# Patient Record
Sex: MALE | Race: WHITE | NOT HISPANIC OR LATINO
[De-identification: names, ages, dates, MRNs, and addresses within clinical notes are randomized per-mention and may not be internally consistent; named-entity substitution may affect disease eponyms.]

---

## 2024-07-08 ENCOUNTER — NON-APPOINTMENT (OUTPATIENT)
Age: 29
End: 2024-07-08

## 2024-07-09 ENCOUNTER — EMERGENCY (EMERGENCY)
Facility: HOSPITAL | Age: 29
LOS: 0 days | Discharge: HOME | End: 2024-07-09
Attending: STUDENT IN AN ORGANIZED HEALTH CARE EDUCATION/TRAINING PROGRAM
Payer: COMMERCIAL

## 2024-07-09 VITALS
DIASTOLIC BLOOD PRESSURE: 82 MMHG | RESPIRATION RATE: 16 BRPM | OXYGEN SATURATION: 98 % | SYSTOLIC BLOOD PRESSURE: 125 MMHG | TEMPERATURE: 98 F | WEIGHT: 130.07 LBS | HEART RATE: 73 BPM

## 2024-07-09 DIAGNOSIS — R19.7 DIARRHEA, UNSPECIFIED: ICD-10-CM

## 2024-07-09 DIAGNOSIS — R10.13 EPIGASTRIC PAIN: ICD-10-CM

## 2024-07-09 DIAGNOSIS — R11.2 NAUSEA WITH VOMITING, UNSPECIFIED: ICD-10-CM

## 2024-07-09 LAB
ACANTHOCYTES BLD QL SMEAR: SLIGHT — SIGNIFICANT CHANGE UP
ALBUMIN SERPL ELPH-MCNC: 4.8 G/DL — SIGNIFICANT CHANGE UP (ref 3.5–5.2)
ALP SERPL-CCNC: 76 U/L — SIGNIFICANT CHANGE UP (ref 30–115)
ALT FLD-CCNC: 14 U/L — SIGNIFICANT CHANGE UP (ref 0–41)
ANION GAP SERPL CALC-SCNC: 13 MMOL/L — SIGNIFICANT CHANGE UP (ref 7–14)
ANISOCYTOSIS BLD QL: SLIGHT — SIGNIFICANT CHANGE UP
AST SERPL-CCNC: 19 U/L — SIGNIFICANT CHANGE UP (ref 0–41)
BASOPHILS # BLD AUTO: 0.17 K/UL — SIGNIFICANT CHANGE UP (ref 0–0.2)
BASOPHILS NFR BLD AUTO: 0.9 % — SIGNIFICANT CHANGE UP (ref 0–1)
BILIRUB SERPL-MCNC: 0.4 MG/DL — SIGNIFICANT CHANGE UP (ref 0.2–1.2)
BUN SERPL-MCNC: 10 MG/DL — SIGNIFICANT CHANGE UP (ref 10–20)
BURR CELLS BLD QL SMEAR: PRESENT — SIGNIFICANT CHANGE UP
CALCIUM SERPL-MCNC: 10.1 MG/DL — SIGNIFICANT CHANGE UP (ref 8.4–10.4)
CHLORIDE SERPL-SCNC: 96 MMOL/L — LOW (ref 98–110)
CO2 SERPL-SCNC: 30 MMOL/L — SIGNIFICANT CHANGE UP (ref 17–32)
CREAT SERPL-MCNC: 1 MG/DL — SIGNIFICANT CHANGE UP (ref 0.7–1.5)
EGFR: 104 ML/MIN/1.73M2 — SIGNIFICANT CHANGE UP
EOSINOPHIL # BLD AUTO: 0 K/UL — SIGNIFICANT CHANGE UP (ref 0–0.7)
EOSINOPHIL NFR BLD AUTO: 0 % — SIGNIFICANT CHANGE UP (ref 0–8)
GIANT PLATELETS BLD QL SMEAR: PRESENT — SIGNIFICANT CHANGE UP
GLUCOSE SERPL-MCNC: 123 MG/DL — HIGH (ref 70–99)
HCT VFR BLD CALC: 35.2 % — LOW (ref 42–52)
HGB BLD-MCNC: 10.5 G/DL — LOW (ref 14–18)
HYPOCHROMIA BLD QL: SLIGHT — SIGNIFICANT CHANGE UP
LIDOCAIN IGE QN: 25 U/L — SIGNIFICANT CHANGE UP (ref 7–60)
LYMPHOCYTES # BLD AUTO: 0.34 K/UL — LOW (ref 1.2–3.4)
LYMPHOCYTES # BLD AUTO: 1.8 % — LOW (ref 20.5–51.1)
MANUAL SMEAR VERIFICATION: SIGNIFICANT CHANGE UP
MCHC RBC-ENTMCNC: 21.9 PG — LOW (ref 27–31)
MCHC RBC-ENTMCNC: 29.8 G/DL — LOW (ref 32–37)
MCV RBC AUTO: 73.5 FL — LOW (ref 80–94)
MICROCYTES BLD QL: SLIGHT — SIGNIFICANT CHANGE UP
MONOCYTES # BLD AUTO: 1.65 K/UL — HIGH (ref 0.1–0.6)
MONOCYTES NFR BLD AUTO: 8.8 % — SIGNIFICANT CHANGE UP (ref 1.7–9.3)
NEUTROPHILS # BLD AUTO: 16.13 K/UL — HIGH (ref 1.4–6.5)
NEUTROPHILS NFR BLD AUTO: 85.8 % — HIGH (ref 42.2–75.2)
OVALOCYTES BLD QL SMEAR: SLIGHT — SIGNIFICANT CHANGE UP
PLAT MORPH BLD: ABNORMAL
PLATELET # BLD AUTO: 361 K/UL — SIGNIFICANT CHANGE UP (ref 130–400)
PMV BLD: 10.9 FL — HIGH (ref 7.4–10.4)
POIKILOCYTOSIS BLD QL AUTO: SLIGHT — SIGNIFICANT CHANGE UP
POLYCHROMASIA BLD QL SMEAR: SIGNIFICANT CHANGE UP
POTASSIUM SERPL-MCNC: 4 MMOL/L — SIGNIFICANT CHANGE UP (ref 3.5–5)
POTASSIUM SERPL-SCNC: 4 MMOL/L — SIGNIFICANT CHANGE UP (ref 3.5–5)
PROT SERPL-MCNC: 7.8 G/DL — SIGNIFICANT CHANGE UP (ref 6–8)
RBC # BLD: 4.79 M/UL — SIGNIFICANT CHANGE UP (ref 4.7–6.1)
RBC # FLD: 16.9 % — HIGH (ref 11.5–14.5)
RBC BLD AUTO: ABNORMAL
SODIUM SERPL-SCNC: 139 MMOL/L — SIGNIFICANT CHANGE UP (ref 135–146)
STOMATOCYTES BLD QL SMEAR: SLIGHT — SIGNIFICANT CHANGE UP
VARIANT LYMPHS # BLD: 2.7 % — SIGNIFICANT CHANGE UP (ref 0–5)
WBC # BLD: 18.8 K/UL — HIGH (ref 4.8–10.8)
WBC # FLD AUTO: 18.8 K/UL — HIGH (ref 4.8–10.8)

## 2024-07-09 PROCEDURE — 96374 THER/PROPH/DIAG INJ IV PUSH: CPT

## 2024-07-09 PROCEDURE — 80053 COMPREHEN METABOLIC PANEL: CPT

## 2024-07-09 PROCEDURE — 96375 TX/PRO/DX INJ NEW DRUG ADDON: CPT

## 2024-07-09 PROCEDURE — 85025 COMPLETE CBC W/AUTO DIFF WBC: CPT

## 2024-07-09 PROCEDURE — 83690 ASSAY OF LIPASE: CPT

## 2024-07-09 PROCEDURE — 99284 EMERGENCY DEPT VISIT MOD MDM: CPT

## 2024-07-09 PROCEDURE — 99284 EMERGENCY DEPT VISIT MOD MDM: CPT | Mod: 25

## 2024-07-09 PROCEDURE — 36415 COLL VENOUS BLD VENIPUNCTURE: CPT

## 2024-07-09 RX ORDER — METOCLOPRAMIDE 5 MG/5ML
10 SOLUTION ORAL ONCE
Refills: 0 | Status: COMPLETED | OUTPATIENT
Start: 2024-07-09 | End: 2024-07-09

## 2024-07-09 RX ORDER — SODIUM CHLORIDE 0.9 % (FLUSH) 0.9 %
1000 SYRINGE (ML) INJECTION ONCE
Refills: 0 | Status: COMPLETED | OUTPATIENT
Start: 2024-07-09 | End: 2024-07-09

## 2024-07-09 RX ORDER — FAMOTIDINE 40 MG
20 TABLET ORAL DAILY
Refills: 0 | Status: DISCONTINUED | OUTPATIENT
Start: 2024-07-09 | End: 2024-07-09

## 2024-07-09 RX ORDER — ONDANSETRON HYDROCHLORIDE 2 MG/ML
4 INJECTION INTRAMUSCULAR; INTRAVENOUS ONCE
Refills: 0 | Status: COMPLETED | OUTPATIENT
Start: 2024-07-09 | End: 2024-07-09

## 2024-07-09 RX ORDER — ONDANSETRON HYDROCHLORIDE 2 MG/ML
1 INJECTION INTRAMUSCULAR; INTRAVENOUS
Qty: 1 | Refills: 0
Start: 2024-07-09 | End: 2024-07-11

## 2024-07-09 RX ADMIN — Medication 100 MILLIGRAM(S): at 17:17

## 2024-07-09 RX ADMIN — METOCLOPRAMIDE 10 MILLIGRAM(S): 5 SOLUTION ORAL at 15:17

## 2024-07-09 RX ADMIN — ONDANSETRON HYDROCHLORIDE 4 MILLIGRAM(S): 2 INJECTION INTRAMUSCULAR; INTRAVENOUS at 17:17

## 2024-07-09 RX ADMIN — Medication 1000 MILLILITER(S): at 15:17

## 2024-07-14 ENCOUNTER — INPATIENT (INPATIENT)
Facility: HOSPITAL | Age: 29
LOS: 2 days | Discharge: HOME | DRG: 382 | End: 2024-07-17
Attending: SURGERY | Admitting: SURGERY
Payer: COMMERCIAL

## 2024-07-14 VITALS
OXYGEN SATURATION: 99 % | RESPIRATION RATE: 20 BRPM | HEART RATE: 89 BPM | TEMPERATURE: 98 F | SYSTOLIC BLOOD PRESSURE: 126 MMHG | DIASTOLIC BLOOD PRESSURE: 91 MMHG

## 2024-07-14 LAB
ALBUMIN SERPL ELPH-MCNC: 5.1 G/DL — SIGNIFICANT CHANGE UP (ref 3.5–5.2)
ALP SERPL-CCNC: 64 U/L — SIGNIFICANT CHANGE UP (ref 30–115)
ALT FLD-CCNC: 11 U/L — SIGNIFICANT CHANGE UP (ref 0–41)
ANION GAP SERPL CALC-SCNC: 12 MMOL/L — SIGNIFICANT CHANGE UP (ref 7–14)
AST SERPL-CCNC: 16 U/L — SIGNIFICANT CHANGE UP (ref 0–41)
BASOPHILS # BLD AUTO: 0.09 K/UL — SIGNIFICANT CHANGE UP (ref 0–0.2)
BASOPHILS NFR BLD AUTO: 0.9 % — SIGNIFICANT CHANGE UP (ref 0–1)
BILIRUB SERPL-MCNC: 0.5 MG/DL — SIGNIFICANT CHANGE UP (ref 0.2–1.2)
BUN SERPL-MCNC: 9 MG/DL — LOW (ref 10–20)
CALCIUM SERPL-MCNC: 10.3 MG/DL — SIGNIFICANT CHANGE UP (ref 8.4–10.5)
CHLORIDE SERPL-SCNC: 96 MMOL/L — LOW (ref 98–110)
CO2 SERPL-SCNC: 33 MMOL/L — HIGH (ref 17–32)
CREAT SERPL-MCNC: 1 MG/DL — SIGNIFICANT CHANGE UP (ref 0.7–1.5)
EGFR: 104 ML/MIN/1.73M2 — SIGNIFICANT CHANGE UP
EOSINOPHIL # BLD AUTO: 0 K/UL — SIGNIFICANT CHANGE UP (ref 0–0.7)
EOSINOPHIL NFR BLD AUTO: 0 % — SIGNIFICANT CHANGE UP (ref 0–8)
GLUCOSE SERPL-MCNC: 169 MG/DL — HIGH (ref 70–99)
HCT VFR BLD CALC: 37 % — LOW (ref 42–52)
HGB BLD-MCNC: 11.2 G/DL — LOW (ref 14–18)
IMM GRANULOCYTES NFR BLD AUTO: 0.3 % — SIGNIFICANT CHANGE UP (ref 0.1–0.3)
LIDOCAIN IGE QN: 26 U/L — SIGNIFICANT CHANGE UP (ref 7–60)
LYMPHOCYTES # BLD AUTO: 0.89 K/UL — LOW (ref 1.2–3.4)
LYMPHOCYTES # BLD AUTO: 9 % — LOW (ref 20.5–51.1)
MCHC RBC-ENTMCNC: 22 PG — LOW (ref 27–31)
MCHC RBC-ENTMCNC: 30.3 G/DL — LOW (ref 32–37)
MCV RBC AUTO: 72.7 FL — LOW (ref 80–94)
MONOCYTES # BLD AUTO: 0.82 K/UL — HIGH (ref 0.1–0.6)
MONOCYTES NFR BLD AUTO: 8.3 % — SIGNIFICANT CHANGE UP (ref 1.7–9.3)
NEUTROPHILS # BLD AUTO: 8.05 K/UL — HIGH (ref 1.4–6.5)
NEUTROPHILS NFR BLD AUTO: 81.5 % — HIGH (ref 42.2–75.2)
NRBC # BLD: 0 /100 WBCS — SIGNIFICANT CHANGE UP (ref 0–0)
PLATELET # BLD AUTO: 426 K/UL — HIGH (ref 130–400)
PMV BLD: 11.1 FL — HIGH (ref 7.4–10.4)
POTASSIUM SERPL-MCNC: 4.2 MMOL/L — SIGNIFICANT CHANGE UP (ref 3.5–5)
POTASSIUM SERPL-SCNC: 4.2 MMOL/L — SIGNIFICANT CHANGE UP (ref 3.5–5)
PROT SERPL-MCNC: 8.4 G/DL — HIGH (ref 6–8)
RBC # BLD: 5.09 M/UL — SIGNIFICANT CHANGE UP (ref 4.7–6.1)
RBC # FLD: 16.5 % — HIGH (ref 11.5–14.5)
SODIUM SERPL-SCNC: 141 MMOL/L — SIGNIFICANT CHANGE UP (ref 135–146)
TROPONIN T, HIGH SENSITIVITY RESULT: <6 NG/L — SIGNIFICANT CHANGE UP (ref 6–21)
WBC # BLD: 9.88 K/UL — SIGNIFICANT CHANGE UP (ref 4.8–10.8)
WBC # FLD AUTO: 9.88 K/UL — SIGNIFICANT CHANGE UP (ref 4.8–10.8)

## 2024-07-14 PROCEDURE — 74177 CT ABD & PELVIS W/CONTRAST: CPT | Mod: 26,MC

## 2024-07-14 PROCEDURE — 99285 EMERGENCY DEPT VISIT HI MDM: CPT

## 2024-07-14 RX ORDER — ONDANSETRON HYDROCHLORIDE 2 MG/ML
4 INJECTION INTRAMUSCULAR; INTRAVENOUS ONCE
Refills: 0 | Status: COMPLETED | OUTPATIENT
Start: 2024-07-14 | End: 2024-07-14

## 2024-07-14 RX ORDER — SODIUM CHLORIDE 0.9 % (FLUSH) 0.9 %
1000 SYRINGE (ML) INJECTION ONCE
Refills: 0 | Status: COMPLETED | OUTPATIENT
Start: 2024-07-14 | End: 2024-07-14

## 2024-07-14 RX ORDER — FAMOTIDINE 40 MG
20 TABLET ORAL ONCE
Refills: 0 | Status: COMPLETED | OUTPATIENT
Start: 2024-07-14 | End: 2024-07-14

## 2024-07-14 RX ADMIN — ONDANSETRON HYDROCHLORIDE 4 MILLIGRAM(S): 2 INJECTION INTRAMUSCULAR; INTRAVENOUS at 23:02

## 2024-07-14 RX ADMIN — Medication 20 MILLIGRAM(S): at 23:03

## 2024-07-14 RX ADMIN — Medication 1000 MILLILITER(S): at 23:02

## 2024-07-15 DIAGNOSIS — K31.1 ADULT HYPERTROPHIC PYLORIC STENOSIS: ICD-10-CM

## 2024-07-15 PROBLEM — Z78.9 OTHER SPECIFIED HEALTH STATUS: Chronic | Status: ACTIVE | Noted: 2024-07-09

## 2024-07-15 LAB
ANION GAP SERPL CALC-SCNC: 10 MMOL/L — SIGNIFICANT CHANGE UP (ref 7–14)
BASOPHILS # BLD AUTO: 0.08 K/UL — SIGNIFICANT CHANGE UP (ref 0–0.2)
BASOPHILS NFR BLD AUTO: 0.8 % — SIGNIFICANT CHANGE UP (ref 0–1)
BUN SERPL-MCNC: 4 MG/DL — LOW (ref 10–20)
CALCIUM SERPL-MCNC: 9.1 MG/DL — SIGNIFICANT CHANGE UP (ref 8.4–10.5)
CHLORIDE SERPL-SCNC: 103 MMOL/L — SIGNIFICANT CHANGE UP (ref 98–110)
CO2 SERPL-SCNC: 28 MMOL/L — SIGNIFICANT CHANGE UP (ref 17–32)
CREAT SERPL-MCNC: 0.9 MG/DL — SIGNIFICANT CHANGE UP (ref 0.7–1.5)
EGFR: 119 ML/MIN/1.73M2 — SIGNIFICANT CHANGE UP
EOSINOPHIL # BLD AUTO: 0.28 K/UL — SIGNIFICANT CHANGE UP (ref 0–0.7)
EOSINOPHIL NFR BLD AUTO: 2.9 % — SIGNIFICANT CHANGE UP (ref 0–8)
GLUCOSE SERPL-MCNC: 79 MG/DL — SIGNIFICANT CHANGE UP (ref 70–99)
HCT VFR BLD CALC: 33.5 % — LOW (ref 42–52)
HGB BLD-MCNC: 9.7 G/DL — LOW (ref 14–18)
IMM GRANULOCYTES NFR BLD AUTO: 0.2 % — SIGNIFICANT CHANGE UP (ref 0.1–0.3)
IRON SATN MFR SERPL: 16 UG/DL — LOW (ref 35–150)
IRON SATN MFR SERPL: 5 % — LOW (ref 15–50)
LYMPHOCYTES # BLD AUTO: 2.62 K/UL — SIGNIFICANT CHANGE UP (ref 1.2–3.4)
LYMPHOCYTES # BLD AUTO: 27.3 % — SIGNIFICANT CHANGE UP (ref 20.5–51.1)
MAGNESIUM SERPL-MCNC: 1.9 MG/DL — SIGNIFICANT CHANGE UP (ref 1.8–2.4)
MCHC RBC-ENTMCNC: 21.7 PG — LOW (ref 27–31)
MCHC RBC-ENTMCNC: 29 G/DL — LOW (ref 32–37)
MCV RBC AUTO: 75.1 FL — LOW (ref 80–94)
MONOCYTES # BLD AUTO: 1.07 K/UL — HIGH (ref 0.1–0.6)
MONOCYTES NFR BLD AUTO: 11.2 % — HIGH (ref 1.7–9.3)
NEUTROPHILS # BLD AUTO: 5.51 K/UL — SIGNIFICANT CHANGE UP (ref 1.4–6.5)
NEUTROPHILS NFR BLD AUTO: 57.6 % — SIGNIFICANT CHANGE UP (ref 42.2–75.2)
NRBC # BLD: 0 /100 WBCS — SIGNIFICANT CHANGE UP (ref 0–0)
PHOSPHATE SERPL-MCNC: 3.1 MG/DL — SIGNIFICANT CHANGE UP (ref 2.1–4.9)
PLATELET # BLD AUTO: 353 K/UL — SIGNIFICANT CHANGE UP (ref 130–400)
PMV BLD: 10.8 FL — HIGH (ref 7.4–10.4)
POTASSIUM SERPL-MCNC: 4.5 MMOL/L — SIGNIFICANT CHANGE UP (ref 3.5–5)
POTASSIUM SERPL-SCNC: 4.5 MMOL/L — SIGNIFICANT CHANGE UP (ref 3.5–5)
RBC # BLD: 4.46 M/UL — LOW (ref 4.7–6.1)
RBC # FLD: 16.8 % — HIGH (ref 11.5–14.5)
SODIUM SERPL-SCNC: 141 MMOL/L — SIGNIFICANT CHANGE UP (ref 135–146)
TIBC SERPL-MCNC: 343 UG/DL — SIGNIFICANT CHANGE UP (ref 220–430)
TRANSFERRIN SERPL-MCNC: 293 MG/DL — SIGNIFICANT CHANGE UP (ref 200–360)
UIBC SERPL-MCNC: 327 UG/DL — SIGNIFICANT CHANGE UP (ref 110–370)
WBC # BLD: 9.58 K/UL — SIGNIFICANT CHANGE UP (ref 4.8–10.8)
WBC # FLD AUTO: 9.58 K/UL — SIGNIFICANT CHANGE UP (ref 4.8–10.8)

## 2024-07-15 PROCEDURE — 74018 RADEX ABDOMEN 1 VIEW: CPT

## 2024-07-15 PROCEDURE — 88305 TISSUE EXAM BY PATHOLOGIST: CPT

## 2024-07-15 PROCEDURE — 86364 TISS TRNSGLTMNASE EA IG CLAS: CPT

## 2024-07-15 PROCEDURE — 83540 ASSAY OF IRON: CPT

## 2024-07-15 PROCEDURE — 84466 ASSAY OF TRANSFERRIN: CPT

## 2024-07-15 PROCEDURE — 83735 ASSAY OF MAGNESIUM: CPT

## 2024-07-15 PROCEDURE — 84100 ASSAY OF PHOSPHORUS: CPT

## 2024-07-15 PROCEDURE — 36415 COLL VENOUS BLD VENIPUNCTURE: CPT

## 2024-07-15 PROCEDURE — 80048 BASIC METABOLIC PNL TOTAL CA: CPT

## 2024-07-15 PROCEDURE — 88312 SPECIAL STAINS GROUP 1: CPT

## 2024-07-15 PROCEDURE — 93005 ELECTROCARDIOGRAM TRACING: CPT

## 2024-07-15 PROCEDURE — 85025 COMPLETE CBC W/AUTO DIFF WBC: CPT

## 2024-07-15 PROCEDURE — 93010 ELECTROCARDIOGRAM REPORT: CPT

## 2024-07-15 PROCEDURE — 99223 1ST HOSP IP/OBS HIGH 75: CPT

## 2024-07-15 PROCEDURE — 71046 X-RAY EXAM CHEST 2 VIEWS: CPT | Mod: 26

## 2024-07-15 PROCEDURE — 83550 IRON BINDING TEST: CPT

## 2024-07-15 PROCEDURE — 82728 ASSAY OF FERRITIN: CPT

## 2024-07-15 PROCEDURE — 86140 C-REACTIVE PROTEIN: CPT

## 2024-07-15 PROCEDURE — 83036 HEMOGLOBIN GLYCOSYLATED A1C: CPT

## 2024-07-15 PROCEDURE — 85652 RBC SED RATE AUTOMATED: CPT

## 2024-07-15 RX ORDER — ENOXAPARIN SODIUM 100 MG/ML
40 INJECTION SUBCUTANEOUS EVERY 24 HOURS
Refills: 0 | Status: DISCONTINUED | OUTPATIENT
Start: 2024-07-15 | End: 2024-07-17

## 2024-07-15 RX ORDER — DEXTROSE MONOHYDRATE AND SODIUM CHLORIDE 5; .3 G/100ML; G/100ML
1000 INJECTION, SOLUTION INTRAVENOUS
Refills: 0 | Status: DISCONTINUED | OUTPATIENT
Start: 2024-07-15 | End: 2024-07-17

## 2024-07-15 RX ORDER — PANTOPRAZOLE SODIUM 40 MG/10ML
40 INJECTION, POWDER, FOR SOLUTION INTRAVENOUS DAILY
Refills: 0 | Status: DISCONTINUED | OUTPATIENT
Start: 2024-07-15 | End: 2024-07-16

## 2024-07-15 RX ORDER — ACETAMINOPHEN 325 MG
650 TABLET ORAL EVERY 6 HOURS
Refills: 0 | Status: DISCONTINUED | OUTPATIENT
Start: 2024-07-15 | End: 2024-07-17

## 2024-07-15 RX ORDER — ONDANSETRON HYDROCHLORIDE 2 MG/ML
4 INJECTION INTRAMUSCULAR; INTRAVENOUS ONCE
Refills: 0 | Status: COMPLETED | OUTPATIENT
Start: 2024-07-15 | End: 2024-07-15

## 2024-07-15 RX ORDER — ONDANSETRON HYDROCHLORIDE 2 MG/ML
4 INJECTION INTRAMUSCULAR; INTRAVENOUS EVERY 6 HOURS
Refills: 0 | Status: DISCONTINUED | OUTPATIENT
Start: 2024-07-15 | End: 2024-07-17

## 2024-07-15 RX ORDER — PANTOPRAZOLE SODIUM 40 MG/10ML
40 INJECTION, POWDER, FOR SOLUTION INTRAVENOUS ONCE
Refills: 0 | Status: COMPLETED | OUTPATIENT
Start: 2024-07-15 | End: 2024-07-15

## 2024-07-15 RX ORDER — DEXTROSE MONOHYDRATE AND SODIUM CHLORIDE 5; .3 G/100ML; G/100ML
1000 INJECTION, SOLUTION INTRAVENOUS ONCE
Refills: 0 | Status: COMPLETED | OUTPATIENT
Start: 2024-07-15 | End: 2024-07-15

## 2024-07-15 RX ADMIN — DEXTROSE MONOHYDRATE AND SODIUM CHLORIDE 110 MILLILITER(S): 5; .3 INJECTION, SOLUTION INTRAVENOUS at 08:10

## 2024-07-15 RX ADMIN — PANTOPRAZOLE SODIUM 40 MILLIGRAM(S): 40 INJECTION, POWDER, FOR SOLUTION INTRAVENOUS at 02:28

## 2024-07-15 RX ADMIN — DEXTROSE MONOHYDRATE AND SODIUM CHLORIDE 1000 MILLILITER(S): 5; .3 INJECTION, SOLUTION INTRAVENOUS at 02:28

## 2024-07-15 RX ADMIN — Medication 650 MILLIGRAM(S): at 05:31

## 2024-07-15 RX ADMIN — ONDANSETRON HYDROCHLORIDE 4 MILLIGRAM(S): 2 INJECTION INTRAMUSCULAR; INTRAVENOUS at 02:28

## 2024-07-15 NOTE — ED PROVIDER NOTE - PHYSICAL EXAMINATION
VITAL SIGNS: I have reviewed nursing notes and confirm.  CONSTITUTIONAL: in no acute distress.  SKIN: Skin exam is warm and dry, no acute rash.  HEAD: Normocephalic; atraumatic.  EYES:  EOM intact; conjunctiva and sclera clear.  ENT: No nasal discharge; airway clear.   NECK: Supple; non tender.  CARD: S1, S2 normal; no murmurs, gallops, or rubs. Regular rate and rhythm.  RESP: No wheezes, rales or rhonchi. Speaking in full sentences.   ABD: soft; non-distended; epigastric tender; No rebound or guarding. No CVA tenderness.  NEURO: Alert, oriented. Grossly unremarkable. No focal deficits.

## 2024-07-15 NOTE — ED ADULT NURSE REASSESSMENT NOTE - NS ED NURSE REASSESS COMMENT FT1
Surgical team attempted to place NG tube. During placement pt refused and wanted it taken out. Surgical team expressed the needs of NG tube and educated the risks of refusal. Pt currently waiting for surgery team to reassess him in the morning. No NG placed.

## 2024-07-15 NOTE — PROVIDER CONTACT NOTE (OTHER) - ACTION/TREATMENT ORDERED:
Provider aware, no further intervention.
Provider notified, of the above, no further intervention at this time

## 2024-07-15 NOTE — ED PROVIDER NOTE - IV ALTEPLASE ADMIN OUTSIDE HIDDEN
Quality 226: Preventive Care And Screening: Tobacco Use: Screening And Cessation Intervention: Patient screened for tobacco use and is an ex/non-smoker
Quality 110: Preventive Care And Screening: Influenza Immunization: Influenza Immunization Administered during Influenza season
Quality 130: Documentation Of Current Medications In The Medical Record: Current Medications Documented
show
Quality 431: Preventive Care And Screening: Unhealthy Alcohol Use - Screening: Patient not identified as an unhealthy alcohol user when screened for unhealthy alcohol use using a systematic screening method
Detail Level: Generalized

## 2024-07-15 NOTE — H&P ADULT - ASSESSMENT
The patient is a 28 year old male without significant PMH who presents to the ED with vomiting and abdominal pain. The patient reports that 5 days ago, he came to the ED with nausea and vomiting and diarrhea, but he attributed it to food poisoning, and he was discharged home. It improved over a few days, and he felt fine for one day, but now the nausea and vomiting has returned. He says he has vomited over 10 times today, and that he has only been able to tolerate crackers and some water. He complains of feeling bloated, but has continued to pass gas and most recent bowel movement was 11 am the morning of presentation. Denies any history of gastric ulcers GERD. In the ED, he is afebrile and hemodynamically stable. Labs were within normal limits. CT of the abdomen showed air and fluid distended stomach with relatively decompressed small bowel and large bowel distally, no mechanical obstruction seen, questionable fat stranding along christine hepatis in region of duodenum, cannot exclude gastric outlet obstruction or ileus. In the ED, NGT was placed, but patient was unable to tolerate it and removed it and refused to have it replaced, and expressed understanding of the benefits of NGT placement and the risks if he does not have it placed.     Plan:  - Admit to surgery under Dr. Baltazar  - NPO, IVF  - NGT placed, patient removed and refused replacement, expressed understanding of risks associated with not replacing  - protonix  - DVT ppx  - Zofran  - GI consult    Discussed the above with Dr. Baltazar, patient, and ED

## 2024-07-15 NOTE — PATIENT PROFILE ADULT - FALL HARM RISK - HARM RISK INTERVENTIONS

## 2024-07-15 NOTE — H&P ADULT - NSHPPHYSICALEXAM_GEN_ALL_CORE
General: NAD, AAOx3  HEENT: NCAT, FABIAN, EOMI  Cardiac: RRR S1, S2  Respiratory: normal respiratory effort,   Abdomen: thin habitus, Soft, slightly distended, mild diffuse tenderness, no rebound, no guarding  Musculoskeletal: ROM intact, compartments soft  Neuro: Sensation grossly intact and equal throughout, no focal deficits  Vascular: extremities well perfused  Skin: Warm/dry, normal color, no jaundice

## 2024-07-15 NOTE — PROVIDER CONTACT NOTE (MEDICATION) - ACTION/TREATMENT ORDERED:
Provider notified
Provider stated pt can have PO meds, will update diet order. provider stated to hold afternoon dose of Protonix. will speak with attending for note

## 2024-07-15 NOTE — CHART NOTE - NSCHARTNOTEFT_GEN_A_CORE
Pt was asked again for a NG tube placement. Per GI --> Nasogastric tube suction is recommended before endoscopy to minimize retained fluid that may increase the risk of aspiration during endoscopy. GI can't proceed with endoscopy until NG tube in place.    Pt also refusing Tylenol and Lovenox Pt was asked again for a NG tube placement. Per GI --> Nasogastric tube suction is recommended before endoscopy to minimize retained fluid that may increase the risk of aspiration during endoscopy. GI can't proceed with endoscopy until NG tube in place.    Pt understand the risk and still refuses the Tx    Pt also refusing Tylenol and Lovenox

## 2024-07-15 NOTE — PROVIDER CONTACT NOTE (OTHER) - SITUATION
pt refused PO Tylenol and Lovenox despite teaching and education
pt's BP is low at 99/66 HR 64, pt asymptomatic . pt is receiving ordered IVF

## 2024-07-15 NOTE — ED PROVIDER NOTE - ATTENDING APP SHARED VISIT CONTRIBUTION OF CARE
Patient is c/o abd pain, epigastric area, associated with nausea/vomiting. No trauma.   Vitals reviewed.   Lungs: CTA, no wheezing, no crackles.  Abd: +BS, +epigastric tenderness, ND, soft,   A/P: Abd pain,   Labs, imaging,   reevaluation.

## 2024-07-15 NOTE — PROVIDER CONTACT NOTE (MEDICATION) - BACKGROUND
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

## 2024-07-15 NOTE — PROVIDER CONTACT NOTE (MEDICATION) - SITUATION
pt is ordered for PO meds, but is NPO, can Rn given PO meds, can diet ordered be changed. pt is ordered for Protonix daily at noon, pt received a dose at 0228 this AM, should RN hold afternoon dose? pt is ordered for PO meds, but is NPO, can Rn given PO meds, can diet order be except meds?  pt is ordered for Protonix daily at noon, pt received a dose at 0228 this AM, should RN hold afternoon dose?

## 2024-07-15 NOTE — ED PROVIDER NOTE - DIFFERENTIAL DIAGNOSIS
Pancreatitis, hepatic failure, obstructive uropathy, diverticulitis, colitis, abscess, bowel obstruction, bowel perforation. Electrolyte abnormalities, GRAEME, and GI bleeding. Differential Diagnosis

## 2024-07-15 NOTE — H&P ADULT - ATTENDING COMMENTS
This is 28 year old male without significant PMH who presents to the ED with vomiting and abdominal pain. The patient reports that 5 days ago, he came to the ED with nausea and vomiting and diarrhea, but he attributed it to food poisoning, and he was discharged home. It improved over a few days, and he felt fine for one day, but now the nausea and vomiting has returned. He says he has vomited over 10 times today, and that he has only been able to tolerate crackers and some water. He complains of feeling bloated, but has continued to pass gas and most recent bowel movement was 11 am the morning of presentation. Denies any history of gastric ulcers GERD. In the ED, he is afebrile and hemodynamically stable.    PE:  AAO x4  Chest: clear.  CV : RRR  Abdomen: soft, nontender    CT Abd/Pelvis was reviewed and shows large distended stomach suspicious for gastric outlet obstruction.    ASSESSMENT:  29 y/o male with Gastric Distention.  Possible Gastric Outlet Obstruction.    PLAN:  - NPO, IVF  - NGT to low continuous suction  - follow serum electrolytes and UOP  - GI eval for EGD for diagnosis and possible treatment.  - DVT prophylaxis  Admit to Surgery.

## 2024-07-15 NOTE — CONSULT NOTE ADULT - ASSESSMENT
28 year old male without significant PMH who presents to the ED with vomiting and abdominal pain. The patient reports that 5 days ago, he came to the ED with nausea and vomiting and diarrhea, but he attributed it to food poisoning, and he was discharged home. It improved over a few days, and he felt fine for one day, but now the nausea and vomiting has returned. He says he has vomited over 10 times today, and that he has only been able to tolerate crackers and some water. He complains of feeling bloated, but has continued to pass gas and most recent bowel movement was 11 am the morning of presentation. Denies any history of gastric ulcers GERD. No hx of melena, hematochezia, or hematemesis. No chills, night sweats, or weight loss. No dysphagia or odynophagia. No FHx of GI malignancy.       # Nausea and vomiting, Distended stomach R/O GOO   # Microcytic anemia  - pt with diarrhea and vomit since 7/9, diarrhea resolved but vomiting persisted  - afebrile, HD stable   - last BM 7/14 normal , last episode of vomit at 4am   - WBC 18.8 on July 8 trended down to 9.88 on July 14  - Hgb 10.5 with MCV of 73.5 on July 9--> 11.2 on July 14 (no baseline)   - CT AP with IVC: Air and fluid distended stomach with relatively decompressed small and large bowel distally. No definite visualized mechanical obstruction. Questionable fat stranding along the christine hepatis in the region of the proximal duodenum which is obscured by patient's upper extremity. Cannot exclude gastric outlet obstruction or ileus. Recommend short-term follow-up with proper positioning or endoscopic evaluation based on clinical suspicion.  - no hx of melena, hematochezia, or hematochezia  - no weight loss   - no FHx of GI malignancy       Recs  - Nasogastric tube suction is recommended before endoscopy to minimize retained fluid that may increase the risk of aspiration during endoscopy  - discussed with pt importance of placing an NGT, but pt currently refusing  - bowel rest for now   - obtain iron panel  - check A1c       28 year old male without significant PMH who presents to the ED with vomiting and abdominal pain. The patient reports that 5 days ago, he came to the ED with nausea and vomiting and diarrhea, but he attributed it to food poisoning, and he was discharged home. It improved over a few days, and he felt fine for one day, but now the nausea and vomiting has returned. He says he has vomited over 10 times today, and that he has only been able to tolerate crackers and some water. He complains of feeling bloated, but has continued to pass gas and most recent bowel movement was 11 am the morning of presentation. Denies any history of gastric ulcers GERD. No hx of melena, hematochezia, or hematemesis. No chills, night sweats, or weight loss. No dysphagia or odynophagia. No FHx of GI malignancy.       # Nausea and vomiting, Distended stomach R/O GOO   # Microcytic anemia  - pt with diarrhea and vomit since 7/9, diarrhea resolved but vomiting persisted  - afebrile, HD stable   - last BM 7/14 normal , last episode of vomit at 4am   - WBC 18.8 on July 8 trended down to 9.88 on July 14  - Hgb 10.5 with MCV of 73.5 on July 9--> 11.2 on July 14 (no baseline)   - CT AP with IVC: Air and fluid distended stomach with relatively decompressed small and large bowel distally. No definite visualized mechanical obstruction. Questionable fat stranding along the christine hepatis in the region of the proximal duodenum which is obscured by patient's upper extremity. Cannot exclude gastric outlet obstruction or ileus. Recommend short-term follow-up with proper positioning or endoscopic evaluation based on clinical suspicion.  - no hx of melena, hematochezia, or hematochezia  - no weight loss   - no FHx of GI malignancy       Recs  - Nasogastric tube suction is recommended before endoscopy to minimize retained fluid that may increase the risk of aspiration during endoscopy  - discussed with pt importance of placing an NGT, but pt currently refusing  - bowel rest for now   - obtain iron panel  - CHeck Serum IgA transglutaminase  - check A1c       28 year old male without significant PMH who presents to the ED with vomiting and abdominal pain. The patient reports that 5 days ago, he came to the ED with nausea and vomiting and diarrhea, but he attributed it to food poisoning, and he was discharged home. It improved over a few days, and he felt fine for one day, but now the nausea and vomiting has returned. He says he has vomited over 10 times today, and that he has only been able to tolerate crackers and some water. He complains of feeling bloated, but has continued to pass gas and most recent bowel movement was 11 am the morning of presentation. Denies any history of gastric ulcers GERD. No hx of melena, hematochezia, or hematemesis. No chills, night sweats, or weight loss. No dysphagia or odynophagia. No FHx of GI malignancy.       # Nausea and vomiting, Distended stomach R/O GOO 2/2 PUD, duodenal Crohn's, vs malignant stricture   # Microcytic anemia  - pt with diarrhea and vomit since 7/9, diarrhea resolved but vomiting persisted  - afebrile, HD stable   - last BM 7/14 normal , last episode of vomit at 4am   - WBC 18.8 on July 8 trended down to 9.88 on July 14  - Hgb 10.5 with MCV of 73.5 on July 9--> 11.2 on July 14 (no baseline)   - CT AP with IVC: Air and fluid distended stomach with relatively decompressed small and large bowel distally. No definite visualized mechanical obstruction. Questionable fat stranding along the christine hepatis in the region of the proximal duodenum which is obscured by patient's upper extremity. Cannot exclude gastric outlet obstruction or ileus. Recommend short-term follow-up with proper positioning or endoscopic evaluation based on clinical suspicion.  - no hx of melena, hematochezia, or hematochezia  - no weight loss   - no FHx of GI malignancy       Recs  - pt seen again at 7 pm with no further episodes of vomiting and still refusing NGT placement , as pt with no vomit, will schedule for an EGD tomorrow on 7/16. Pt advised about importance of NGT placement in case of recurrent vomiting.   - bowel rest for now   - obtain iron panel  - Check Serum IgA/ IgG transglutaminase and IgA  - Check ESR/ CRP    - check A1c   - start PPI IV BID

## 2024-07-15 NOTE — ED PROVIDER NOTE - EKG/XRAY ADDITIONAL INFORMATION
EKG reviewed by me Dr. Butterfield and shows, sinus rhythm, MA/QRS/QTC intervals are in acceptable range, no significant ST/T wave changes noted.  Chest X-rays reviewed and interpreted by me Dr. Butterfield and shows no actue findings. No Pneumothorax, no free air, no effusions, and these findings discussed with patient.

## 2024-07-15 NOTE — PATIENT PROFILE ADULT - PATIENT'S PREFERRED PRONOUN
Airway  Performed by: Shadi Ruby MD  Authorized by: Louisa Luna MD     Final Airway Type:  Endotracheal airway  Final Endotracheal Airway*:  ETT  ETT Size (mm)*:  8.0  Cuff*:  Regular  Technique Used for Successful ETT Placement:  Direct laryngoscopy  Devices/Methods Used in Placement*:  Mask and Oral ETT  Intubation Procedure*:  Preoxygenation, ETCO2, Atraumatic, Dentition Unchanged and Pharynx Clear  Insertion Site:  Oral  Blade Type*:  MAC  Blade Size*:  3  Cuff Volume (mL):  9  Measured from*:  Lips  Secured at (cm)*:  23  Placement Verified by: auscultation and capnometry    Glottic View*:  1 - full view of glottis  Attempts*:  1  Ventilation Between Attempts:  None  Number of Other Approaches Attempted:  0   Patient Identified, Procedure confirmed, Emergency equipment available and Safety protocols followed  Location:  OR  Urgency:  Elective  Difficult Airway: No    Indications for Airway Management:  Anesthesia and airway protection  Spontaneous Ventilation: absent    Sedation Level:  Anesthetized  MILS Maintained Throughout: No    Mask Difficulty Assessment:  1 - vent by mask  Performed By:  Anesthesiologist  Anesthesiologist:  Shadi Ruby MD  Start Time: 12/10/2021 3:57 PM        
Him/He

## 2024-07-15 NOTE — ED PROVIDER NOTE - OBJECTIVE STATEMENT
28-year-old male no medical history presenting to ED for evaluation of epigastric pain.  Patient states that he was seen in the hospital 5 days ago for similar symptoms states at that time he had vomiting and associated diarrhea and believes he had a stomach bug.  Since diarrhea has stopped but states vomiting has continued and when he does throw up and makes his epigastric pain worse.  Denies fever, chills, specific CP, SOB or back pain, cough

## 2024-07-15 NOTE — H&P ADULT - HISTORY OF PRESENT ILLNESS
GENERAL SURGERY CONSULT NOTE    Patient: JEROME JOINER , 28y ([birth year])Male   MRN: [id number]  Location: [facility] ED  Visit: 07-14-24 Emergency  Date: 07-15-24 @ 02:51    HPI:  The patient is a 28 year old male without significant PMH who presents to the ED with vomiting and abdominal pain. The patient reports that 5 days ago, he came to the ED with nausea and vomiting and diarrhea, but he attributed it to food poisoning, and he was discharged home. It improved over a few days, and he felt fine for one day, but now the nausea and vomiting has returned. He says he has vomited over 10 times today, and that he has only been able to tolerate crackers and some water. He complains of feeling bloated, but has continued to pass gas and most recent bowel movement was 11 am the morning of presentation. Denies any history of gastric ulcers GERD. In the ED, he is afebrile and hemodynamically stable. Labs were within normal limits. CT of the abdomen showed air and fluid distended stomach with relatively decompressed small bowel and large bowel distally, no mechanical obstruction seen, questionable fat stranding along christine hepatis in region of duodenum, cannot exclude gastric outlet obstruction or ileus. In the ED, NGT was placed, but patient was unable to tolerate it and removed it and refused to have it replaced, and expressed understanding of the benefits of NGT placement and the risks if he does not have it placed.     PAST MEDICAL & SURGICAL HISTORY:  No pertinent past medical history      No significant past surgical history          VITALS:  T(F): 98.2 (07-14-24 @ 23:57), Max: 98.2 (07-14-24 @ 23:57)  HR: 71 (07-14-24 @ 23:57) (71 - 89)  BP: 117/74 (07-14-24 @ 23:57) (117/74 - 126/91)  RR: 20 (07-14-24 @ 22:35) (20 - 20)  SpO2: 100% (07-14-24 @ 23:57) (99% - 100%)

## 2024-07-15 NOTE — H&P ADULT - NSHPLABSRESULTS_GEN_ALL_CORE
LABS:  cret                        11.2   9.88  )-----------( 426      ( 14 Jul 2024 23:12 )             37.0     07-14    141  |  96<L>  |  9<L>  ----------------------------<  169<H>  4.2   |  33<H>  |  1.0    Ca    10.3      14 Jul 2024 23:12    TPro  8.4<H>  /  Alb  5.1  /  TBili  0.5  /  DBili  x   /  AST  16  /  ALT  11  /  AlkPhos  64  07-14        Albumin: 5.1 g/dL (07-14-24 @ 23:12)

## 2024-07-15 NOTE — CONSULT NOTE ADULT - SUBJECTIVE AND OBJECTIVE BOX
Gastroenterology Consultation:    Patient is a 28y old  Male who presents with a chief complaint of rule out gastric outlet obstruction (15 Jul 2024 02:50)      Admitted on: 07-15-24      Patient: JEROME JOINER , 28y ([birth year])Male   MRN: [id number]  Location: [facility], [place] ED  Visit: 07-14-24 Emergency  Date: 07-15-24 @ 02:51    HPI:   28 year old male without significant PMH who presents to the ED with vomiting and abdominal pain. The patient reports that 5 days ago, he came to the ED with nausea and vomiting and diarrhea, but he attributed it to food poisoning, and he was discharged home. It improved over a few days, and he felt fine for one day, but now the nausea and vomiting has returned. He says he has vomited over 10 times today, and that he has only been able to tolerate crackers and some water. He complains of feeling bloated, but has continued to pass gas and most recent bowel movement was 11 am the morning of presentation. Denies any history of gastric ulcers GERD. No hx of melena, hematochezia, or hematemesis. No chills, night sweats, or weight loss. No dysphagia or odynophagia. No FHx of GI malignancy.       In the ED, he is afebrile and hemodynamically stable. Labs were within normal limits. CT of the abdomen showed air and fluid distended stomach with relatively decompressed small bowel and large bowel distally, no mechanical obstruction seen, questionable fat stranding along christine hepatis in region of duodenum, cannot exclude gastric outlet obstruction or ileus. In the ED, NGT was placed, but patient was unable to tolerate it and removed it and refused to have it replaced, and expressed understanding of the benefits of NGT placement and the risks if he does not have it placed.     PAST MEDICAL & SURGICAL HISTORY:  No pertinent past medical history      No significant past surgical history          VITALS:  T(F): 98.2 (07-14-24 @ 23:57), Max: 98.2 (07-14-24 @ 23:57)  HR: 71 (07-14-24 @ 23:57) (71 - 89)  BP: 117/74 (07-14-24 @ 23:57) (117/74 - 126/91)  RR: 20 (07-14-24 @ 22:35) (20 - 20)  SpO2: 100% (07-14-24 @ 23:57) (99% - 100%)     (15 Jul 2024 02:50)        Prior EGD: none     Prior Colonoscopy: none      PAST MEDICAL & SURGICAL HISTORY:  No pertinent past medical history      No significant past surgical history            FAMILY HISTORY:      Social History:  Tobacco: denies   Alcohol: socially   Drugs: denies     Home Medications:        MEDICATIONS  (STANDING):  acetaminophen     Tablet .. 650 milliGRAM(s) Oral every 6 hours  enoxaparin Injectable 40 milliGRAM(s) SubCutaneous every 24 hours  lactated ringers. 1000 milliLiter(s) (110 mL/Hr) IV Continuous <Continuous>  pantoprazole  Injectable 40 milliGRAM(s) IV Push daily    MEDICATIONS  (PRN):  ondansetron Injectable 4 milliGRAM(s) IV Push every 6 hours PRN Nausea      Allergies  No Known Allergies      Review of Systems:   Constitutional:  No Fever, No Chills  ENT/Mouth:  No Hearing Changes,  No Difficulty Swallowing  Eyes:  No Eye Pain, No Vision Changes  Cardiovascular:  No Chest Pain, No Palpitations  Respiratory:  No Cough, No Dyspnea  Gastrointestinal:  As described in HPI  Musculoskeletal:  No Joint Swelling, No Back Pain  Skin:  No Skin Lesions, No Jaundice  Neuro:  No Syncope, No Dizziness  Heme/Lymph:  No Bruising, No Bleeding.          Physical Examination:  T(C): 37.7 (07-15-24 @ 04:12), Max: 37.7 (07-15-24 @ 04:12)  HR: 85 (07-15-24 @ 04:12) (71 - 89)  BP: 114/77 (07-15-24 @ 04:12) (114/77 - 126/91)  RR: 18 (07-15-24 @ 04:12) (18 - 20)  SpO2: 98% (07-15-24 @ 04:12) (98% - 100%)  Height (cm): 172.7 (07-15-24 @ 04:12)  Weight (kg): 56.8 (07-15-24 @ 04:12)    07-14-24 @ 07:01  -  07-15-24 @ 07:00  --------------------------------------------------------  IN: 110 mL / OUT: 0 mL / NET: 110 mL    07-15-24 @ 07:01  -  07-15-24 @ 10:18  --------------------------------------------------------  IN: 220 mL / OUT: 0 mL / NET: 220 mL          GENERAL: AAOx3, no acute distress.  poor dental hygiene   CHEST/LUNG: Clear to auscultation bilaterally; No wheeze, rhonchi, or rales  HEART: Regular rate and rhythm; normal S1, S2, No murmurs.  ABDOMEN: Soft, nontender, nondistended; Bowel sounds present  NEUROLOGY: No asterixis or tremor.   SKIN: Intact, no jaundice        Data:                        11.2   9.88  )-----------( 426      ( 14 Jul 2024 23:12 )             37.0     Hgb Trend:  11.2  07-14-24 @ 23:12        07-14    141  |  96<L>  |  9<L>  ----------------------------<  169<H>  4.2   |  33<H>  |  1.0    Ca    10.3      14 Jul 2024 23:12    TPro  8.4<H>  /  Alb  5.1  /  TBili  0.5  /  DBili  x   /  AST  16  /  ALT  11  /  AlkPhos  64  07-14    Liver panel trend:  TBili 0.5   /   AST 16   /   ALT 11   /   AlkP 64   /   Tptn 8.4   /   Alb 5.1    /   DBili --      07-14  TBili 0.4   /   AST 19   /   ALT 14   /   AlkP 76   /   Tptn 7.8   /   Alb 4.8    /   DBili --      07-09              Radiology:  CT Abdomen and Pelvis w/ IV Cont:   ACC: [id number] EXAM:  CT ABDOMEN AND PELVIS IC   ORDERED BY: MARILU MOFFETT     PROCEDURE DATE:  07/14/2024          INTERPRETATION:  CLINICAL INFORMATION: Epigastric pain, nausea and   vomiting for one week    COMPARISON: None.    CONTRAST/COMPLICATIONS:  IV Contrast: Omnipaque 350  100 cc administered   0 cc discarded  Oral Contrast: NONE  Complications: None reported at time of study completion    PROCEDURE:  CT of the Abdomen and Pelvis was performed.  Sagittal and coronal reformats were performed.    FINDINGS:  LOWER CHEST: Within normal limits.    LIVER: Within normal limits.  BILE DUCTS: Normal caliber.  GALLBLADDER: Within normal limits.  SPLEEN: Within normal limits.  PANCREAS: Within normal limits.  ADRENALS: Within normal limits.  KIDNEYS/URETERS: Symmetrically enhancing. No hydronephrosis. Bilateral   subcentimeter renal hypodensities too small to characterize.    BLADDER: Within normal limits.  REPRODUCTIVE ORGANS: Unremarkable.    BOWEL: Air and fluid distended stomach with relatively decompressed small   and large bowel. No definite visualized mechanical obstruction.   Questionable fat stranding along the christine hepatis in the region of the   proximal duodenum obscured by streak artifact from patient's upper   extremity. Appendix is normal.  PERITONEUM/RETROPERITONEUM: No pneumoperitoneum or ascites.  VESSELS: Within normal limits.  LYMPH NODES: No lymphadenopathy.  ABDOMINAL WALL: Within normal limits.  BONES: Within normal limits.    IMPRESSION:  Air and fluid distended stomach with relatively decompressed small and   large bowel distally. No definite visualized mechanical obstruction.   Questionable fat stranding along the christine hepatis in the region of the   proximal duodenum which is obscured by patient's upper extremity. Cannot   exclude gastric outlet obstruction or ileus. Recommend short-term   follow-up with proper positioning or endoscopic evaluation based on   clinical suspicion.    Communication: The summary of above findings were discussed with readback   confirmation with OFELIA Moffett by radiology resident Ruben Chavez MD   7/15/2024 12:24 AM    --- End of Report ---          RUBEN CHAVEZ MD; Resident Radiologist  This document has been electronically signed.  KATE PATEL MD; Attending Radiologist  This document has been electronically signed. Jul 15 2024 12:33AM (07-14-24 @ 23:58)

## 2024-07-16 ENCOUNTER — RESULT REVIEW (OUTPATIENT)
Age: 29
End: 2024-07-16

## 2024-07-16 LAB
ANION GAP SERPL CALC-SCNC: 10 MMOL/L — SIGNIFICANT CHANGE UP (ref 7–14)
BASOPHILS # BLD AUTO: 0.09 K/UL — SIGNIFICANT CHANGE UP (ref 0–0.2)
BASOPHILS NFR BLD AUTO: 1 % — SIGNIFICANT CHANGE UP (ref 0–1)
BUN SERPL-MCNC: 8 MG/DL — LOW (ref 10–20)
CALCIUM SERPL-MCNC: 9.2 MG/DL — SIGNIFICANT CHANGE UP (ref 8.4–10.5)
CHLORIDE SERPL-SCNC: 100 MMOL/L — SIGNIFICANT CHANGE UP (ref 98–110)
CO2 SERPL-SCNC: 26 MMOL/L — SIGNIFICANT CHANGE UP (ref 17–32)
CREAT SERPL-MCNC: 0.9 MG/DL — SIGNIFICANT CHANGE UP (ref 0.7–1.5)
CRP SERPL-MCNC: <3 MG/L — SIGNIFICANT CHANGE UP
EGFR: 119 ML/MIN/1.73M2 — SIGNIFICANT CHANGE UP
EOSINOPHIL # BLD AUTO: 0.21 K/UL — SIGNIFICANT CHANGE UP (ref 0–0.7)
EOSINOPHIL NFR BLD AUTO: 2.4 % — SIGNIFICANT CHANGE UP (ref 0–8)
ERYTHROCYTE [SEDIMENTATION RATE] IN BLOOD: 17 MM/HR — HIGH (ref 0–10)
GLUCOSE SERPL-MCNC: 64 MG/DL — LOW (ref 70–99)
HCT VFR BLD CALC: 32.8 % — LOW (ref 42–52)
HGB BLD-MCNC: 9.7 G/DL — LOW (ref 14–18)
IMM GRANULOCYTES NFR BLD AUTO: 0.3 % — SIGNIFICANT CHANGE UP (ref 0.1–0.3)
LYMPHOCYTES # BLD AUTO: 2.98 K/UL — SIGNIFICANT CHANGE UP (ref 1.2–3.4)
LYMPHOCYTES # BLD AUTO: 34.5 % — SIGNIFICANT CHANGE UP (ref 20.5–51.1)
MAGNESIUM SERPL-MCNC: 1.7 MG/DL — LOW (ref 1.8–2.4)
MCHC RBC-ENTMCNC: 21.8 PG — LOW (ref 27–31)
MCHC RBC-ENTMCNC: 29.6 G/DL — LOW (ref 32–37)
MCV RBC AUTO: 73.7 FL — LOW (ref 80–94)
MONOCYTES # BLD AUTO: 1.01 K/UL — HIGH (ref 0.1–0.6)
MONOCYTES NFR BLD AUTO: 11.7 % — HIGH (ref 1.7–9.3)
NEUTROPHILS # BLD AUTO: 4.32 K/UL — SIGNIFICANT CHANGE UP (ref 1.4–6.5)
NEUTROPHILS NFR BLD AUTO: 50.1 % — SIGNIFICANT CHANGE UP (ref 42.2–75.2)
NRBC # BLD: 0 /100 WBCS — SIGNIFICANT CHANGE UP (ref 0–0)
PHOSPHATE SERPL-MCNC: 3.9 MG/DL — SIGNIFICANT CHANGE UP (ref 2.1–4.9)
PLATELET # BLD AUTO: 335 K/UL — SIGNIFICANT CHANGE UP (ref 130–400)
PMV BLD: 11.3 FL — HIGH (ref 7.4–10.4)
POTASSIUM SERPL-MCNC: 4.7 MMOL/L — SIGNIFICANT CHANGE UP (ref 3.5–5)
POTASSIUM SERPL-SCNC: 4.7 MMOL/L — SIGNIFICANT CHANGE UP (ref 3.5–5)
RBC # BLD: 4.45 M/UL — LOW (ref 4.7–6.1)
RBC # FLD: 16.4 % — HIGH (ref 11.5–14.5)
SODIUM SERPL-SCNC: 136 MMOL/L — SIGNIFICANT CHANGE UP (ref 135–146)
WBC # BLD: 8.64 K/UL — SIGNIFICANT CHANGE UP (ref 4.8–10.8)
WBC # FLD AUTO: 8.64 K/UL — SIGNIFICANT CHANGE UP (ref 4.8–10.8)

## 2024-07-16 PROCEDURE — 43239 EGD BIOPSY SINGLE/MULTIPLE: CPT

## 2024-07-16 PROCEDURE — 88312 SPECIAL STAINS GROUP 1: CPT | Mod: 26

## 2024-07-16 PROCEDURE — 99232 SBSQ HOSP IP/OBS MODERATE 35: CPT

## 2024-07-16 PROCEDURE — 88305 TISSUE EXAM BY PATHOLOGIST: CPT | Mod: 26

## 2024-07-16 RX ORDER — PANTOPRAZOLE SODIUM 40 MG/10ML
40 INJECTION, POWDER, FOR SOLUTION INTRAVENOUS EVERY 12 HOURS
Refills: 0 | Status: DISCONTINUED | OUTPATIENT
Start: 2024-07-16 | End: 2024-07-17

## 2024-07-16 RX ADMIN — DEXTROSE MONOHYDRATE AND SODIUM CHLORIDE 110 MILLILITER(S): 5; .3 INJECTION, SOLUTION INTRAVENOUS at 18:26

## 2024-07-16 RX ADMIN — ENOXAPARIN SODIUM 40 MILLIGRAM(S): 100 INJECTION SUBCUTANEOUS at 18:32

## 2024-07-16 RX ADMIN — PANTOPRAZOLE SODIUM 40 MILLIGRAM(S): 40 INJECTION, POWDER, FOR SOLUTION INTRAVENOUS at 18:28

## 2024-07-16 NOTE — PROGRESS NOTE ADULT - ATTENDING COMMENTS
Patient is refusing NGT understanding the risk of aspiration.  No abdominal pain today.  Remains NPO.    PE:  AAO x3  Chest: clear.  CV : RRR  Abdomen: soft    ASSESSMENT:  27 y/o male with Gastric Distention.  Possible Gastric Outlet Obstruction.    PLAN:  - GI for EGD today  - NPO, IVF  - Strict I/Os  - DVT prophylaxis

## 2024-07-16 NOTE — CHART NOTE - NSCHARTNOTEFT_GEN_A_CORE
PACU ANESTHESIA ADMISSION NOTE      Procedure:   Post op diagnosis:      ____  Intubated  TV:______       Rate: ______      FiO2: ______    __x__  Patent Airway    __x__  Full return of protective reflexes    __x__  Full recovery from anesthesia / back to baseline status    Vitals  HR: 99  BP: 98/63  RR: 14  O2 Sat: 98  Temp: 97.5    Mental Status:  __x__ Awake   ___x__ Alert   _____ Drowsy   _____ Sedated    Nausea/Vomiting:  __x__ NO  ______Yes,   See Post - Op Orders          Pain Scale (0-10):  _____    Treatment: ____ None    __x__ See Post - Op/PCA Orders    Post - Operative Fluids:   ____ Oral   __x__ See Post - Op Orders    Plan: Discharge when criteria met:   ____Home       __x___Floor     _____Critical Care   Other:_________________    Comments: Patient had smooth intraoperative event, no anesthesia complication.

## 2024-07-16 NOTE — CHART NOTE - NSCHARTNOTEFT_GEN_A_CORE
EGD on 7/16/2024:     Impression:   Grade A esophagitis compatible with non-erosive esophagitis.  Erythema in the stomach compatible with non-erosive gastritis. (Biopsy).  A pyloric stricture was seen. Using minimal pressure, the upper endoscope was easily traversed through the stricture with no difficulty.  A 10mm clean-based non-bleeding ulcer was noted in the duodenal bulb. The remainder of the duodenal mucosa was otherwise unremarkable.    Plan   - Await pathology results  - PPI BID PO for 8 weeks  - avoid NSAIDs  - advance diet as tolerated  - follow up as outpatient for biopsy results

## 2024-07-17 ENCOUNTER — TRANSCRIPTION ENCOUNTER (OUTPATIENT)
Age: 29
End: 2024-07-17

## 2024-07-17 VITALS
SYSTOLIC BLOOD PRESSURE: 94 MMHG | OXYGEN SATURATION: 99 % | HEART RATE: 80 BPM | DIASTOLIC BLOOD PRESSURE: 62 MMHG | TEMPERATURE: 99 F | RESPIRATION RATE: 18 BRPM

## 2024-07-17 LAB
A1C WITH ESTIMATED AVERAGE GLUCOSE RESULT: 5.4 % — SIGNIFICANT CHANGE UP (ref 4–5.6)
ESTIMATED AVERAGE GLUCOSE: 108 MG/DL — SIGNIFICANT CHANGE UP (ref 68–114)
FERRITIN SERPL-MCNC: 8 NG/ML — LOW (ref 30–400)

## 2024-07-17 PROCEDURE — 74018 RADEX ABDOMEN 1 VIEW: CPT | Mod: 26

## 2024-07-17 PROCEDURE — 99238 HOSP IP/OBS DSCHRG MGMT 30/<: CPT

## 2024-07-17 RX ORDER — PANTOPRAZOLE SODIUM 40 MG/10ML
1 INJECTION, POWDER, FOR SOLUTION INTRAVENOUS
Qty: 120 | Refills: 0
Start: 2024-07-17 | End: 2024-09-14

## 2024-07-17 RX ORDER — MAGNESIUM SULFATE 100 %
2 POWDER (GRAM) MISCELLANEOUS ONCE
Refills: 0 | Status: COMPLETED | OUTPATIENT
Start: 2024-07-17 | End: 2024-07-17

## 2024-07-17 RX ORDER — IOHEXOL 350 MG/ML
30 INJECTION, SOLUTION INTRAVENOUS ONCE
Refills: 0 | Status: COMPLETED | OUTPATIENT
Start: 2024-07-17 | End: 2024-07-17

## 2024-07-17 RX ADMIN — IOHEXOL 30 MILLILITER(S): 350 INJECTION, SOLUTION INTRAVENOUS at 09:41

## 2024-07-17 RX ADMIN — PANTOPRAZOLE SODIUM 40 MILLIGRAM(S): 40 INJECTION, POWDER, FOR SOLUTION INTRAVENOUS at 17:21

## 2024-07-17 RX ADMIN — Medication 25 GRAM(S): at 05:39

## 2024-07-17 NOTE — PROGRESS NOTE ADULT - SUBJECTIVE AND OBJECTIVE BOX
TRAUMA SURGERY PROGRESS NOTE    Patient: JEROME JOINER , 28y ([birth year])Male   MRN: [id number]  Location: [address]  Visit: 07-15-24 Inpatient  Date: 07-17-24 @ 03:37    Hospital Day #: 3  Post-Op Day #:    Procedure/Dx/Injuries: gastric outlet obstruction     Events of past 24 hours:  no acute event overnight. no abdominal pain. no BM/flatus. c/o hunger.     PAST MEDICAL & SURGICAL HISTORY:  No pertinent past medical history      No significant past surgical history          Vitals:   T(F): 98.1 (07-17-24 @ 00:13), Max: 99 (07-16-24 @ 14:45)  HR: 58 (07-17-24 @ 00:13)  BP: 99/64 (07-17-24 @ 00:32)  RR: 18 (07-17-24 @ 00:13)  SpO2: 99% (07-17-24 @ 00:13)      Diet, NPO      Fluids:     I & O's:    07-15-24 @ 07:01  -  07-16-24 @ 07:00  --------------------------------------------------------  IN:    Lactated Ringers: 1210 mL  Total IN: 1210 mL    OUT:    Voided (mL): 800 mL  Total OUT: 800 mL    Total NET: 410 mL        Bowel Movement: : [] YES [x] NO  Flatus: : [] YES [x] NO    PHYSICAL EXAM:  General: NAD  HEENT: Normocephalic, atraumatic, EOMI  Neck: Soft, midline trachea  Cardiac: S1, S2  Respiratory: No labored breathing, equal chest rise and fall.   Abdomen: Soft, mildly distended, nontender.    MEDICATIONS  (STANDING):  acetaminophen     Tablet .. 650 milliGRAM(s) Oral every 6 hours  enoxaparin Injectable 40 milliGRAM(s) SubCutaneous every 24 hours  lactated ringers. 1000 milliLiter(s) (110 mL/Hr) IV Continuous <Continuous>  magnesium sulfate  IVPB 2 Gram(s) IV Intermittent once  pantoprazole  Injectable 40 milliGRAM(s) IV Push every 12 hours    MEDICATIONS  (PRN):  ondansetron Injectable 4 milliGRAM(s) IV Push every 6 hours PRN Nausea      DVT PROPHYLAXIS: SCDs, enoxaparin Injectable 40 milliGRAM(s) SubCutaneous every 24 hours    GI PROPHYLAXIS: pantoprazole  Injectable 40 milliGRAM(s) IV Push every 12 hours    ANTICOAGULATION:   ANTIBIOTICS:           LAB/STUDIES:  Labs:  CAPILLARY BLOOD GLUCOSE                              9.7    8.64  )-----------( 335      ( 16 Jul 2024 21:23 )             32.8       Auto Neutrophil %: 50.1 % (07-16-24 @ 21:23)  Auto Immature Granulocyte %: 0.3 % (07-16-24 @ 21:23)    07-16    136  |  100  |  8<L>  ----------------------------<  64<L>  4.7   |  26  |  0.9      Calcium: 9.2 mg/dL (07-16-24 @ 21:23)      LFTs:             Coags:            Urinalysis Basic - ( 16 Jul 2024 21:23 )    Color: x / Appearance: x / SG: x / pH: x  Gluc: 64 mg/dL / Ketone: x  / Bili: x / Urobili: x   Blood: x / Protein: x / Nitrite: x   Leuk Esterase: x / RBC: x / WBC x   Sq Epi: x / Non Sq Epi: x / Bacteria: x        ASSESSMENT:  28y M w/ no PMH presents with abdominal pain and nausea found to have gastric outlet obstruction. Patient is NPO on IVF and going to EGD today . Denies passing any gas or having any bowel movements    PLAN:  - Keep patient NPO on IVF  - GI recs: IgA and IgG transglutaminase and IgA levels, ESR / CRP and A1C. Will follow up   - Monitor bowel function  - DVT ppx and GI ppx  - Pain management      TRAUMA SPECTRA: 0081
Pt seen and examined at bedside. SP EGD yesterday. No complaining of any pain, nausea, or vomit, Tolerating clear liquid diet.     REVIEW OF SYSTEMS:  Constitutional: No fever, weight loss or fatigue  Cardiovascular: No chest pain, palpitations, dizziness or leg swelling  Gastrointestinal: see above   Skin: No itching, burning, rashes or lesions     Allergies    No Known Allergies    Intolerances        MEDICATIONS:  MEDICATIONS  (STANDING):  acetaminophen     Tablet .. 650 milliGRAM(s) Oral every 6 hours  enoxaparin Injectable 40 milliGRAM(s) SubCutaneous every 24 hours  lactated ringers. 1000 milliLiter(s) (110 mL/Hr) IV Continuous <Continuous>  pantoprazole  Injectable 40 milliGRAM(s) IV Push every 12 hours    MEDICATIONS  (PRN):  ondansetron Injectable 4 milliGRAM(s) IV Push every 6 hours PRN Nausea      Vital Signs Last 24 Hrs  T(C): 36.9 (17 Jul 2024 08:20), Max: 36.9 (17 Jul 2024 08:20)  T(F): 98.4 (17 Jul 2024 08:20), Max: 98.4 (17 Jul 2024 08:20)  HR: 58 (17 Jul 2024 08:20) (58 - 86)  BP: 96/60 (17 Jul 2024 08:20) (95/55 - 108/69)  BP(mean): --  RR: 18 (17 Jul 2024 08:20) (14 - 18)  SpO2: 100% (17 Jul 2024 08:20) (96% - 100%)    Parameters below as of 16 Jul 2024 15:30  Patient On (Oxygen Delivery Method): room air        07-16 @ 07:01  -  07-17 @ 07:00  --------------------------------------------------------  IN: 990 mL / OUT: 0 mL / NET: 990 mL        PHYSICAL EXAM:    General: Well developed; well nourished; in no acute distress  HEENT: MMM, conjunctiva and sclera clear  Gastrointestinal: Soft non-tender non-distended; Normal bowel sounds; No hepatosplenomegaly. No rebound or guarding  Skin: Warm and dry. No obvious rash    LABS:  CBC Full  -  ( 16 Jul 2024 21:23 )  WBC Count : 8.64 K/uL  RBC Count : 4.45 M/uL  Hemoglobin : 9.7 g/dL  Hematocrit : 32.8 %  Platelet Count - Automated : 335 K/uL  Mean Cell Volume : 73.7 fL  Mean Cell Hemoglobin : 21.8 pg  Mean Cell Hemoglobin Concentration : 29.6 g/dL  Auto Neutrophil # : 4.32 K/uL  Auto Lymphocyte # : 2.98 K/uL  Auto Monocyte # : 1.01 K/uL  Auto Eosinophil # : 0.21 K/uL  Auto Basophil # : 0.09 K/uL  Auto Neutrophil % : 50.1 %  Auto Lymphocyte % : 34.5 %  Auto Monocyte % : 11.7 %  Auto Eosinophil % : 2.4 %  Auto Basophil % : 1.0 %    07-16    136  |  100  |  8<L>  ----------------------------<  64<L>  4.7   |  26  |  0.9    Ca    9.2      16 Jul 2024 21:23  Phos  3.9     07-16  Mg     1.7     07-16            Urinalysis Basic - ( 16 Jul 2024 21:23 )    Color: x / Appearance: x / SG: x / pH: x  Gluc: 64 mg/dL / Ketone: x  / Bili: x / Urobili: x   Blood: x / Protein: x / Nitrite: x   Leuk Esterase: x / RBC: x / WBC x   Sq Epi: x / Non Sq Epi: x / Bacteria: x                RADIOLOGY & ADDITIONAL STUDIES:    Assesment :    Plan:
GENERAL SURGERY PROGRESS NOTE    Patient: JEROME JOINER , 28y ([birth year])Male   MRN: [id number]  Location: [address]  Visit: 07-15-24 Inpatient  Date: 07-16-24 @ 10:16    Hospital Day #: 2  Post-Op Day #:    Procedure/Dx/Injuries: Gastric outlet obstruction    Events of past 24 hours: GI agreed to take the patient for EGD today- pt is NPO on LR at a rate of 110 cc/hr. Patient denies nausea, vomiting, passing gas or any bowel movements overnight.     PAST MEDICAL & SURGICAL HISTORY:  No pertinent past medical history      No significant past surgical history          Vitals:   T(F): 98.1 (07-16-24 @ 08:40), Max: 99.2 (07-15-24 @ 23:10)  HR: 69 (07-16-24 @ 08:40)  BP: 107/69 (07-16-24 @ 08:40)  RR: 18 (07-16-24 @ 08:40)  SpO2: 100% (07-16-24 @ 08:40)      Diet, NPO      Fluids:     I & O's:    07-15-24 @ 07:01  -  07-16-24 @ 07:00  --------------------------------------------------------  IN:    Lactated Ringers: 1210 mL  Total IN: 1210 mL    OUT:    Voided (mL): 800 mL  Total OUT: 800 mL    Total NET: 410 mL        Bowel Movement: : [] YES [x] NO  Flatus: : [] YES [x] NO    PHYSICAL EXAM:  General: NAD, AAOx3, calm   Respiratory: Normal respiratory effort saturating at 100% RA  Abdomen: Soft, non-distended, non-tender  Neuro: No focal deficits  Skin: Warm/dry, normal color, no jaundice    MEDICATIONS  (STANDING):  acetaminophen     Tablet .. 650 milliGRAM(s) Oral every 6 hours  enoxaparin Injectable 40 milliGRAM(s) SubCutaneous every 24 hours  lactated ringers. 1000 milliLiter(s) (110 mL/Hr) IV Continuous <Continuous>  pantoprazole  Injectable 40 milliGRAM(s) IV Push every 12 hours    MEDICATIONS  (PRN):  ondansetron Injectable 4 milliGRAM(s) IV Push every 6 hours PRN Nausea      DVT PROPHYLAXIS: enoxaparin Injectable 40 milliGRAM(s) SubCutaneous every 24 hours    GI PROPHYLAXIS: pantoprazole  Injectable 40 milliGRAM(s) IV Push every 12 hours    ANTICOAGULATION:   ANTIBIOTICS:            LAB/STUDIES:  Labs:  CAPILLARY BLOOD GLUCOSE                              9.7    9.58  )-----------( 353      ( 15 Jul 2024 21:38 )             33.5       Auto Neutrophil %: 57.6 % (07-15-24 @ 21:38)  Auto Immature Granulocyte %: 0.2 % (07-15-24 @ 21:38)    07-15    141  |  103  |  4<L>  ----------------------------<  79  4.5   |  28  |  0.9      Calcium: 9.1 mg/dL (07-15-24 @ 21:38)      LFTs:             8.4  | 0.5  | 16       ------------------[64      ( 14 Jul 2024 23:12 )  5.1  | x    | 11          Lipase:26     Amylase:x             Coags:            Urinalysis Basic - ( 15 Jul 2024 21:38 )    Color: x / Appearance: x / SG: x / pH: x  Gluc: 79 mg/dL / Ketone: x  / Bili: x / Urobili: x   Blood: x / Protein: x / Nitrite: x   Leuk Esterase: x / RBC: x / WBC x   Sq Epi: x / Non Sq Epi: x / Bacteria: x                IMAGING: n/a

## 2024-07-17 NOTE — DISCHARGE NOTE PROVIDER - CARE PROVIDER_API CALL
Emerald Atkins  Gastroenterology  [address] [facility] [address] [place] [address]  Phone: [phone]  Fax: [phone]  Follow Up Time: 2 weeks

## 2024-07-17 NOTE — PROGRESS NOTE ADULT - ATTENDING COMMENTS
Patient had EGD yesterday - found with pyloric ulcer, passing the scope easily.  Started on clears.    Abdomen: soft, nontender    ASSESSMENT:  27 y/o male with Gastric Distention.  Pyloric Ulcer.    PLAN:  - discussed with GI -ok for puree diet  - on Protonix po  - needs to follow with GI  - DVT proph

## 2024-07-17 NOTE — CDI QUERY NOTE - NSCDIOTHERTXTBX_GEN_ALL_CORE_HH
DOCUMENTATION CLARIFICATION FORM   Encounter #: [id number]                        Patient’s Name: JEROME JOINER  Medical Record #:                                     Admit Date: 7-  : 1995                                       Discharged pendin-  CDI Specialist/: Neelam                    Contact #: [phone]    Dear Dr. Baltazar,                      Date: 2024    Clinical documentation and/or evidence of the patient’s presentation, evaluation, and medical management, as evidenced below, may support a diagnosis that is not documented in the medical record.  In order to ensure accurate coding and accuracy of the clinical record, the documentation in this patient’s medical record requires additional clarification.      If you think the supporting documentation and/or clinical evidence supports a more specific diagnosis, please include more specific documentation of a diagnosis associated with these findings in your progress note and/or discharge summary.    Please clarify if current calculated BMI is 19.0 can be further specified as:  •	Underweight with current calculated BMI of 19.0  •	Thin with clinically insignificant current calculated BMI of 19  •	Other (specify):    Supporting documentation/clinical evidence:  •	7-15 H&P… Physical Exam: …  thin habitus…  •	7-15 RN Flowsheet...  Height: 172.7 cm, Weight: 56.8 kG, BMI 19.0 kG/m2

## 2024-07-17 NOTE — PROGRESS NOTE ADULT - REASON FOR ADMISSION
rule out gastric outlet obstruction

## 2024-07-17 NOTE — DISCHARGE NOTE NURSING/CASE MANAGEMENT/SOCIAL WORK - NSDCPEFALRISK_GEN_ALL_CORE
For information on Fall & Injury Prevention, visit: [email or web address] OR  [email or web address] OR  https://www.cdc.gov/steadi/patient.html

## 2024-07-17 NOTE — DISCHARGE NOTE PROVIDER - HOSPITAL COURSE
The patient is a 28 year old male without significant PMH who presents to the ED with vomiting and abdominal pain. The patient reports that 5 days ago, he came to the ED with nausea and vomiting and diarrhea, but he attributed it to food poisoning, and he was discharged home. It improved over a few days, and he felt fine for one day, but now the nausea and vomiting has returned. He says he has vomited over 10 times today, and that he has only been able to tolerate crackers and some water. He complains of feeling bloated, but has continued to pass gas and most recent bowel movement was 11 am the morning of presentation. Denies any history of gastric ulcers GERD. In the ED, he is afebrile and hemodynamically stable. Labs were within normal limits. CT of the abdomen showed air and fluid distended stomach with relatively decompressed small bowel and large bowel distally, no mechanical obstruction seen, questionable fat stranding along christine hepatis in region of duodenum, cannot exclude gastric outlet obstruction or ileus. In the ED, NGT was placed, but patient was unable to tolerate it and removed it and refused to have it replaced, and expressed understanding of the benefits of NGT placement and the risks if he does not have it placed.     Gastroenterology recommendations: Avoid NSAIDs, advance diet as tolerated. Follow up on biopsy results as an outpatient. PPI BID PO for 8 weeks.    EGD 7/16: Grade A esophagitis compatible with non-erosive esophagitis. Erythema in the stomach compatible with non-erosive gastritis. (Sent for Biopsy). A pyloric stricture was seen. Using minimal pressure, the upper endoscope was easily traversed through the stricture with no difficulty.  A 10mm clean-based non-bleeding ulcer was noted in the duodenal bulb. The remainder of the duodenal mucosa was otherwise unremarkable.

## 2024-07-17 NOTE — DISCHARGE NOTE PROVIDER - NSDCCPCAREPLAN_GEN_ALL_CORE_FT
PRINCIPAL DISCHARGE DIAGNOSIS  Diagnosis: Gastric outlet obstruction  Assessment and Plan of Treatment: SURGERY DISCHARGE INSTRUCTIONS  FOLLOW-UP - with Dr. Ronald Cee in 1 -2 weeks. Call the office to make an appointment or if you have any questions/concerns.  DIET - pure diet.   PAIN MEDS - Take over the counter extra strength tylenol 650mg every 6 hours for pain if you need stronger pain control. Do not take NSAIDs due to your ulcer, this includes ibuprophen, naproxen, aspirin, etc.  If you develop fever, dizziness, chest pain, trouble breathing, nausea, vomiting, increasing abdominal pain, inability to pass bowel movements, redness/pain/discharge from incisions. Please call the office or go to the emergency room immediately.        SECONDARY DISCHARGE DIAGNOSES  Diagnosis: Duodenal ulcer  Assessment and Plan of Treatment:

## 2024-07-17 NOTE — DISCHARGE NOTE NURSING/CASE MANAGEMENT/SOCIAL WORK - PATIENT PORTAL LINK FT
You can access the FollowMyHealth Patient Portal offered by [facility] by registering at the following website: [email or web address]. By joining [facility]’s FollowMyHealth portal, you will also be able to view your health information using other applications (apps) compatible with our system.

## 2024-07-17 NOTE — CHART NOTE - NSCHARTNOTEFT_GEN_A_CORE
This 29 y/o male who was admitted to [facility] on 7/15/2024 with the following Diagnoses:    ASSESSMENT:  29 y/o male with Gastric Distention.  Possible gastric outlet obstruction.    He was found with Pyloric ulcer.    Patient is thin with clinically insignificant current calculated BMI of 19.

## 2024-07-18 LAB — SURGICAL PATHOLOGY STUDY: SIGNIFICANT CHANGE UP

## 2024-07-19 LAB
TTG IGA SER-ACNC: <0.5 U/ML — SIGNIFICANT CHANGE UP
TTG IGA SER-ACNC: NEGATIVE — SIGNIFICANT CHANGE UP
TTG IGG SER IA-ACNC: NEGATIVE — SIGNIFICANT CHANGE UP
TTG IGG SER-ACNC: <0.8 U/ML — SIGNIFICANT CHANGE UP

## 2024-07-22 PROBLEM — Z00.00 ENCOUNTER FOR PREVENTIVE HEALTH EXAMINATION: Status: ACTIVE | Noted: 2024-07-22

## 2024-07-25 DIAGNOSIS — K31.1 ADULT HYPERTROPHIC PYLORIC STENOSIS: ICD-10-CM

## 2024-07-25 DIAGNOSIS — K20.80 OTHER ESOPHAGITIS WITHOUT BLEEDING: ICD-10-CM

## 2024-07-25 DIAGNOSIS — D50.9 IRON DEFICIENCY ANEMIA, UNSPECIFIED: ICD-10-CM

## 2024-07-25 DIAGNOSIS — K29.70 GASTRITIS, UNSPECIFIED, WITHOUT BLEEDING: ICD-10-CM

## 2024-07-25 DIAGNOSIS — K26.7 CHRONIC DUODENAL ULCER WITHOUT HEMORRHAGE OR PERFORATION: ICD-10-CM

## 2024-07-26 PROBLEM — Z78.9 CURRENT NON-SMOKER: Status: ACTIVE | Noted: 2024-07-26

## 2024-07-26 RX ORDER — PANTOPRAZOLE 40 MG/1
40 TABLET, DELAYED RELEASE ORAL
Refills: 0 | Status: ACTIVE | COMMUNITY

## 2024-07-29 ENCOUNTER — APPOINTMENT (OUTPATIENT)
Dept: GASTROENTEROLOGY | Facility: CLINIC | Age: 29
End: 2024-07-29
Payer: COMMERCIAL

## 2024-07-29 DIAGNOSIS — Z78.9 OTHER SPECIFIED HEALTH STATUS: ICD-10-CM

## 2024-07-29 DIAGNOSIS — A04.8 OTHER SPECIFIED BACTERIAL INTESTINAL INFECTIONS: ICD-10-CM

## 2024-07-29 PROCEDURE — 99214 OFFICE O/P EST MOD 30 MIN: CPT | Mod: 95

## 2024-07-29 RX ORDER — BISMUTH SUBCITRATE POTASSIUM, METRONIDAZOLE, AND TETRACYCLINE HYDROCHLORIDE 140; 125; 125 MG/1; MG/1; MG/1
140-125-125 CAPSULE ORAL 4 TIMES DAILY
Qty: 120 | Refills: 0 | Status: ACTIVE | COMMUNITY
Start: 2024-07-29 | End: 1900-01-01

## 2024-07-29 NOTE — ASSESSMENT
[FreeTextEntry1] : Patient is status post hospital evaluation for gastric outlet obstruction.  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  Biopsies were obtained gastric biopsies revealed H. pylori.  Patiently on pantoprazole twice daily.  Will administer Pylera and then check for H. pylori eradication in the stool.  Afterwards may consider repeat endoscopy to evaluate. Walk in

## 2024-07-29 NOTE — REASON FOR VISIT
[Home] : at home, [unfilled] , at the time of the visit. [Medical Office: ([facility])___] : at the medical office located in  [Patient] : the patient [FreeTextEntry1] : NPA HFU POST EGD

## 2024-07-29 NOTE — HISTORY OF PRESENT ILLNESS
[FreeTextEntry1] : Patient is status post hospital evaluation for gastric outlet obstruction.  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  Biopsies were obtained gastric biopsies revealed H. pylori.  Patiently on pantoprazole twice daily.  [de-identified] : 07/16/24

## 2024-07-29 NOTE — HISTORY OF PRESENT ILLNESS
[FreeTextEntry1] : Patient is status post hospital evaluation for gastric outlet obstruction.  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  Biopsies were obtained gastric biopsies revealed H. pylori.  Patiently on pantoprazole twice daily.  [de-identified] : 07/16/24

## 2024-07-29 NOTE — ASSESSMENT
[FreeTextEntry1] : Patient is status post hospital evaluation for gastric outlet obstruction.  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  Biopsies were obtained gastric biopsies revealed H. pylori.  Patiently on pantoprazole twice daily.  Will administer Pylera and then check for H. pylori eradication in the stool.  Afterwards may consider repeat endoscopy to evaluate.

## 2024-07-29 NOTE — REASON FOR VISIT
[Home] : at home, [unfilled] , at the time of the visit. [Medical Office: ([facility])___] : at the medical office located in  [Patient] : the patient [FreeTextEntry1] : NPA HFU POST EGD

## 2024-10-28 ENCOUNTER — APPOINTMENT (OUTPATIENT)
Dept: GASTROENTEROLOGY | Facility: CLINIC | Age: 29
End: 2024-10-28